# Patient Record
Sex: MALE | Race: WHITE | NOT HISPANIC OR LATINO | Employment: OTHER | URBAN - METROPOLITAN AREA
[De-identification: names, ages, dates, MRNs, and addresses within clinical notes are randomized per-mention and may not be internally consistent; named-entity substitution may affect disease eponyms.]

---

## 2022-04-10 ENCOUNTER — HISTORICAL (OUTPATIENT)
Dept: ADMINISTRATIVE | Facility: HOSPITAL | Age: 77
End: 2022-04-10
Payer: OTHER GOVERNMENT

## 2022-04-29 VITALS — DIASTOLIC BLOOD PRESSURE: 58 MMHG | SYSTOLIC BLOOD PRESSURE: 106 MMHG

## 2022-05-25 ENCOUNTER — OFFICE VISIT (OUTPATIENT)
Dept: NEUROLOGY | Facility: CLINIC | Age: 77
End: 2022-05-25
Payer: OTHER GOVERNMENT

## 2022-05-25 VITALS
SYSTOLIC BLOOD PRESSURE: 112 MMHG | WEIGHT: 205 LBS | BODY MASS INDEX: 28.7 KG/M2 | DIASTOLIC BLOOD PRESSURE: 58 MMHG | HEIGHT: 71 IN

## 2022-05-25 DIAGNOSIS — G70.00 MYASTHENIA GRAVIS: Primary | ICD-10-CM

## 2022-05-25 PROCEDURE — 99213 PR OFFICE/OUTPT VISIT, EST, LEVL III, 20-29 MIN: ICD-10-PCS | Mod: S$PBB,ICN,, | Performed by: NURSE PRACTITIONER

## 2022-05-25 PROCEDURE — 99213 OFFICE O/P EST LOW 20 MIN: CPT | Mod: S$PBB,ICN,, | Performed by: NURSE PRACTITIONER

## 2022-05-25 PROCEDURE — 99999 PR PBB SHADOW E&M-EST. PATIENT-LVL IV: CPT | Mod: PBBFAC,,, | Performed by: NURSE PRACTITIONER

## 2022-05-25 PROCEDURE — 99999 PR PBB SHADOW E&M-EST. PATIENT-LVL IV: ICD-10-PCS | Mod: PBBFAC,,, | Performed by: NURSE PRACTITIONER

## 2022-05-25 PROCEDURE — 99214 OFFICE O/P EST MOD 30 MIN: CPT | Mod: PBBFAC | Performed by: NURSE PRACTITIONER

## 2022-05-25 RX ORDER — ALOGLIPTIN 25 MG/1
25 TABLET, FILM COATED ORAL DAILY
COMMUNITY
Start: 2021-11-29 | End: 2023-09-19

## 2022-05-25 RX ORDER — INSULIN DETEMIR 100 [IU]/ML
50 INJECTION, SOLUTION SUBCUTANEOUS NIGHTLY
COMMUNITY
Start: 2021-11-29

## 2022-05-25 RX ORDER — AMLODIPINE BESYLATE 5 MG/1
5 TABLET ORAL DAILY
COMMUNITY
Start: 2021-11-29 | End: 2023-09-19

## 2022-05-25 RX ORDER — INSULIN ASPART 100 [IU]/ML
20 INJECTION, SOLUTION INTRAVENOUS; SUBCUTANEOUS 2 TIMES DAILY WITH MEALS
COMMUNITY
Start: 2021-11-29

## 2022-05-25 RX ORDER — LISINOPRIL AND HYDROCHLOROTHIAZIDE 20; 25 MG/1; MG/1
1 TABLET ORAL DAILY
COMMUNITY
End: 2023-09-19

## 2022-05-25 RX ORDER — LORATADINE 10 MG/1
10 TABLET ORAL
COMMUNITY
Start: 2021-11-29

## 2022-05-25 RX ORDER — GEMFIBROZIL 600 MG/1
600 TABLET, FILM COATED ORAL 2 TIMES DAILY
COMMUNITY
Start: 2021-11-29

## 2022-05-25 RX ORDER — PYRIDOSTIGMINE BROMIDE 60 MG/1
60 TABLET ORAL 3 TIMES DAILY
COMMUNITY
Start: 2021-11-29

## 2022-05-25 RX ORDER — CARVEDILOL 6.25 MG/1
3.12 TABLET ORAL DAILY
COMMUNITY
Start: 2021-11-29

## 2022-05-25 NOTE — PROGRESS NOTES
"Subjective:       Patient ID: Juan Gold is a 76 y.o. male.    Chief Complaint: Myasthenia Gravis (C/o weakness; abnormal gait; feels like fluid in joints immediately after infusion. On soliris, prev gamunex. Wife states more bad days than good days. Noticed his knees "buckle" on him when walking. )    HPI     Summary of last note:  Dx w/ MG in 2009 (blurred vision, weakness)  Allergic to azothiaprine  Was on cellcept at some point  Has a mediport    On soliris 1200 qow & mestinon 120/day  Infusions are thru [facility]  (was prev on gamunex)  Has never done PLEX    More weakness - knees buckle when he walks  Using his cane to ambulate; uses his walker at home  More tremor over the last 2mo as well  Vision is blurred  No hand strength  No dysphagia at the moment    Has been on soliris for about 4years     Review of Systems    A 14pt ROS was reviewed & is negative unless otherwise documented in the HPI    Objective:      Physical Exam    GENERAL: NAD, calm, cooperative, appropriate  Awake/alert  Well groomed  RESP: CTAB  HEART: RRR  No LE edema  MENTAL STATUS: oriented, follow commands reliably  SPEECH/LANGUAGE: clear, fluent  CN:  Perrla, eomi, vff, gaze conjugate  Blurred vision & fatigable ptosis  Tongue protrudes midline  Motor:   BUE tremor  Hands 2/5  Forearms 3/5  Shoulder shrug 5/5  BLE 4/5  Cerebellar: no tremor or dysmetria  Sensory: normal to tactile stim/vibration  DTRs: normal +2, symmetric  Gait: steady, slow & cautious (forgot cane today)    Irobert np, certify that dr. Oskar albrecht the supervising/rendering physician is physically present in the office at the time of the visit    Assessment:       Problem List Items Addressed This Visit        Neuro    Myasthenia gravis - Primary (Chronic)          Plan:       Will switch his infusions to Vyvgart  Once approved by ins/the [facility], he will start Vyvgart 2 weeks after his last Soliris infusion    He can take additional mestinon if needed until Vyvgart " is approved  Call once 1st Vyvgart infusion is scheduled and we will see him 6 wks after that

## 2022-06-06 ENCOUNTER — TELEPHONE (OUTPATIENT)
Dept: NEUROLOGY | Facility: CLINIC | Age: 77
End: 2022-06-06
Payer: OTHER GOVERNMENT

## 2022-06-07 ENCOUNTER — TELEPHONE (OUTPATIENT)
Dept: NEUROLOGY | Facility: CLINIC | Age: 77
End: 2022-06-07
Payer: OTHER GOVERNMENT

## 2022-06-07 NOTE — TELEPHONE ENCOUNTER
----- Message from Yanet Paulino MA sent at 6/7/2022  2:19 PM CDT -----  Regarding: Infusion Change  Contact: Self  Pt states following up on IVIG infusion change from Solaris to Vyvgart. Pt reports he received his last Solaris infusion 06/01/22.   States he has questions about where his infusions paperwork was sent. States he is calling to clarify what was sent and the process regarding future plan of care. States seeking advice and would like a call back.     Phone:[phone]

## 2022-06-07 NOTE — TELEPHONE ENCOUNTER
Rtn call  Advised it was sent to [facility]  Confirmed it's been received  [facility] many weeks[facility]  It requires insurance investigation and authorization.  He will be contacted After benefits checked.     Asked if he had copies of orig labs, states will look.

## 2022-06-13 ENCOUNTER — TELEPHONE (OUTPATIENT)
Dept: NEUROLOGY | Facility: CLINIC | Age: 77
End: 2022-06-13
Payer: OTHER GOVERNMENT

## 2022-06-13 NOTE — TELEPHONE ENCOUNTER
[facility] with [facility] states they received a referral and need ACHR Status. Pos or neg?     Previous message in chart from 06/06/22.     Callback number: [phone]

## 2022-06-14 NOTE — TELEPHONE ENCOUNTER
This was discussed with Vlad Osorio last week.    Rtn call - advised we do not have ACHR status as pt was dx in 2009 in [place]. He is attempting to get the records.   This will not stop infusion form moving forward.   For documentation purposes only.

## 2022-06-30 ENCOUNTER — TELEPHONE (OUTPATIENT)
Dept: NEUROLOGY | Facility: CLINIC | Age: 77
End: 2022-06-30
Payer: OTHER GOVERNMENT

## 2022-06-30 NOTE — TELEPHONE ENCOUNTER
Stated pt and [facility] need help getting approval for this medication. States it looks like there is an approval, but seeking where approval might be.     Medication: Vyvgart    Ph[facility]: [facility]    Last Appointment: 05/25/22    Next Appointment: none      Phone: [phone], Fax: [phone]

## 2022-06-30 NOTE — TELEPHONE ENCOUNTER
Per rtn call to Rodrigo; After leaving [facility] she reached out to [facility] and was told that Part D has been approved; awaiting VA approval

## 2022-06-30 NOTE — TELEPHONE ENCOUNTER
Pt states on 05/25 appt his treatment was changed from Soliris to Vyvgart. States he is calling for an update and has an infusion tomorrow 07/01/22. States he has no medication and does not want to be in a crisis. States he needs advice on plan of care.     Medication: Vyvgart Infusion    LOV: 05/25/22    NOV: none

## 2022-07-06 ENCOUNTER — TELEPHONE (OUTPATIENT)
Dept: NEUROLOGY | Facility: CLINIC | Age: 77
End: 2022-07-06
Payer: OTHER GOVERNMENT

## 2022-07-06 NOTE — TELEPHONE ENCOUNTER
Rtn call to pt  Jeovanny w male at # provided  Advised [facility] will be reaching out to him to discuss further.

## 2022-07-06 NOTE — TELEPHONE ENCOUNTER
Called [facility] @ [phone]  Spoke brittni Frazier, states they have not yet rcv'd approval of Vyvgart   Reaching out to pharmacy team for clarification.

## 2022-07-06 NOTE — TELEPHONE ENCOUNTER
Pt reports he had a Soliris infusion on 06/17/22. States he is getting bills stating the Vyvgart has been approved and has spoken to the [facility] who told him the MD or NP can order medication from VA. States [facility] told anabel if he orders medication like this they will not do his infusions. Notified of 06/30/22 note.  Pt seeking advice on what to do in case of a crisis. States seeking a callback. Notified of clinic hours.       LOV: 05/25/22    NOV:none

## 2022-07-07 ENCOUNTER — TELEPHONE (OUTPATIENT)
Dept: NEUROLOGY | Facility: CLINIC | Age: 77
End: 2022-07-07
Payer: OTHER GOVERNMENT

## 2022-07-07 NOTE — TELEPHONE ENCOUNTER
States they have received the order for pt's Vyvgart, but the order is needing to be signed.     Medication: Vyvgart    Ph[facility]acy: [facility]    Last Appointment: 05/25/22    Next Appointment: none    Call back number: [phone], Fax: [phone]  Asking if unavailable if Cristina can be contacted.  Phone: [phone]

## 2022-07-07 NOTE — TELEPHONE ENCOUNTER
Pt called to follow up on call from yesterday 07/06. Notified of nurse's advise about [facility] contacting him moving forward.   Pt reports he cannot really do anything, is very weak, eyes drooping and laboring to breath. States these symptoms have progressed since before LOV on 05/25/22 and have been going on for the last 3 to 4 months. Pt states he was advised by YUDI Hernandez to got to [facility] if in crisis.   Pt is asking if there is any infusion center that will allow NP or MD to order the medication through [facility]. States he is seeking advice on plan of care.     LOV: 05/25/22    NOV: none

## 2022-07-07 NOTE — TELEPHONE ENCOUNTER
No, that is not an option.  If he is in crisis, he is to do as I previously instructed & present to the ER.  I let both him & his wife know during his last appointment that vyvgart approval was a very long process, especially with him going thru the [facility].

## 2022-07-07 NOTE — TELEPHONE ENCOUNTER
Called Kirsten @ [facility] @ [phone] - vm/no answer      Called and spoke brittni elkins [facility] @ [phone]  Vyvgart IS approved through pts Medicare    Pt wants it approved through VA, so they will cover expense.  Nohemi submitted info to VA    This will delay initial infusion until VA reviews.    Advised I am being contacted by pt and VA.  Asked that he please clarify what is needed.

## 2022-08-05 ENCOUNTER — TELEPHONE (OUTPATIENT)
Dept: NEUROLOGY | Facility: CLINIC | Age: 77
End: 2022-08-05
Payer: OTHER GOVERNMENT

## 2022-08-05 NOTE — TELEPHONE ENCOUNTER
Pt reports doing his 4th infusion this morning 08/05/22.    Medication: Vyvagart    Pharmacy: none    Last Appointment: 05/25/22    Next Appointment: 08/31/22

## 2022-08-24 ENCOUNTER — TELEPHONE (OUTPATIENT)
Dept: NEUROLOGY | Facility: CLINIC | Age: 77
End: 2022-08-24
Payer: OTHER GOVERNMENT

## 2022-08-24 NOTE — TELEPHONE ENCOUNTER
Reports pt was provided with Vyvgart infusion. Is asking if pt will be continuing this treatment. If so is asking if dosage and frequency can be called in.       Medication: Vyvgart Infusions    Last Appointment: 05/25/22    Next Appointment: 08/30/22

## 2022-08-25 NOTE — TELEPHONE ENCOUNTER
Rtn call  Confirmed pt has infused loading dose of Vyvgart x 4  Reviewed original orders and discussed that infusions are now prn symptoms  Confirmed 12 prn rf's on original order.  Advised Pt has F/U 08/30/22 to re-evaluate

## 2022-08-29 NOTE — PROGRESS NOTES
Subjective:       Patient ID: Juan Gold is a 76 y.o. male.    Chief Complaint: Myasthenia gravis    HPI  Received 4 vyvgart infusions  Last one was on 8/5/22  This is the best that he's felt in a very long time  He was nearly at the point of a crisis   Was eating soups & taking it easy around the house not to labor his breathing anymore that it already was    The day p his 1st infusion, he was cutting his grass & started tinkering with his boat again.  After each infusion, he has felt better and better.  He's decided not to sell their camper & his boat.  Plans to pressure wash his camper & boat today to go camping soon.    Has questions about mediport flushing/maint.    Review of Systems   A 14pt ROS was reviewed & is negative unless otherwise documented in the HPI    Objective:      Physical Exam    GENERAL: NAD, calm, cooperative, appropriate  Awake/alert  Well groomed  RESP: CTAB  HEART: RRR  No LE edema  MENTAL STATUS: oriented, follow commands reliably  SPEECH/LANGUAGE: clear, fluent  CN:  Perrla, eomi, vff, gaze conjugate  No tactile or motor facial asymmetry  Good cheek puff  No fatigable ptosis   Tongue protrudes midline  Motor: no focal weakness  Cerebellar: no tremor or dysmetria  Sensory: normal to tactile stim/vibration  DTRs: normal +2, symmetric  Gait: steady    I, robert london np, certify that dr. Oskar albrecht the supervising/rendering physician is physically present in the office at the time of the visit    Assessment:       Problem List Items Addressed This Visit          Neuro    MG (myasthenia gravis) - Primary (Chronic)       Plan:       Call us when he is symptomatic so we can see him  Call [facility] when he is symptomatic to setup infusion  Will send orders to [facility] for mediport flushes q4-6wks  Rtc 3m or prn weakness

## 2022-08-30 ENCOUNTER — OFFICE VISIT (OUTPATIENT)
Dept: NEUROLOGY | Facility: CLINIC | Age: 77
End: 2022-08-30
Payer: OTHER GOVERNMENT

## 2022-08-30 VITALS
SYSTOLIC BLOOD PRESSURE: 130 MMHG | HEIGHT: 71 IN | BODY MASS INDEX: 28.84 KG/M2 | DIASTOLIC BLOOD PRESSURE: 62 MMHG | WEIGHT: 206 LBS

## 2022-08-30 DIAGNOSIS — G70.00 MG (MYASTHENIA GRAVIS): Primary | ICD-10-CM

## 2022-08-30 PROCEDURE — 99214 OFFICE O/P EST MOD 30 MIN: CPT | Mod: PBBFAC | Performed by: NURSE PRACTITIONER

## 2022-08-30 PROCEDURE — 99213 OFFICE O/P EST LOW 20 MIN: CPT | Mod: S$PBB,,, | Performed by: NURSE PRACTITIONER

## 2022-08-30 PROCEDURE — 99999 PR PBB SHADOW E&M-EST. PATIENT-LVL IV: CPT | Mod: PBBFAC,,, | Performed by: NURSE PRACTITIONER

## 2022-08-30 PROCEDURE — 99999 PR PBB SHADOW E&M-EST. PATIENT-LVL IV: ICD-10-PCS | Mod: PBBFAC,,, | Performed by: NURSE PRACTITIONER

## 2022-08-30 PROCEDURE — 99213 PR OFFICE/OUTPT VISIT, EST, LEVL III, 20-29 MIN: ICD-10-PCS | Mod: S$PBB,,, | Performed by: NURSE PRACTITIONER

## 2022-09-22 ENCOUNTER — TELEPHONE (OUTPATIENT)
Dept: NEUROLOGY | Facility: CLINIC | Age: 77
End: 2022-09-22
Payer: OTHER GOVERNMENT

## 2022-10-03 ENCOUNTER — TELEPHONE (OUTPATIENT)
Dept: NEUROLOGY | Facility: CLINIC | Age: 77
End: 2022-10-03
Payer: OTHER GOVERNMENT

## 2022-10-04 NOTE — PROGRESS NOTES
Subjective:       Patient ID: Juan Gold is a 76 y.o. male.    Chief Complaint: myasthenia gravis (F/u for myasthenia gravis)    HPI  His first Vyvgart infusion was 7/15/2022  Had trouble eating his steak last night & his stamina is going down    Has been more active in the last 2 months than he has for the last 5 years  Planted a large winter garden    Review of Systems   A 14pt ROS was reviewed & is negative unless otherwise documented in the HPI    Objective:      Physical Exam    GENERAL: NAD, calm, cooperative, appropriate  Awake/alert  Well groomed  RESP: CTAB  HEART: RRR  No LE edema  MENTAL STATUS: oriented, follow commands reliably  SPEECH/LANGUAGE: clear, fluent  CN:  Perrla, eomi, vff, gaze conjugate  Extraocular muscles weak  Neck muscles weak  No tactile or motor facial asymmetry  Weak cheek puff  Tongue protrudes midline  Motor: generalized weakness  Cerebellar: no tremor or dysmetria  Sensory: normal to tactile stim/vibration  DTRs: normal +2, symmetric  Gait: steady    I, robert london np, certify that dr. Oskar albrecht the supervising/rendering physician is physically present in the office at the time of the visit    Assessment:       Problem List Items Addressed This Visit          Neuro    MG (myasthenia gravis) - Primary (Chronic)       Plan:       Cont mestinon tid  Vyvgart prn - can do q50d for 1st infusion series if needed  F/u 6m

## 2022-10-06 ENCOUNTER — OFFICE VISIT (OUTPATIENT)
Dept: NEUROLOGY | Facility: CLINIC | Age: 77
End: 2022-10-06
Payer: OTHER GOVERNMENT

## 2022-10-06 VITALS
BODY MASS INDEX: 28.84 KG/M2 | WEIGHT: 206 LBS | DIASTOLIC BLOOD PRESSURE: 74 MMHG | HEIGHT: 71 IN | SYSTOLIC BLOOD PRESSURE: 132 MMHG

## 2022-10-06 DIAGNOSIS — G70.00 MG (MYASTHENIA GRAVIS): Primary | ICD-10-CM

## 2022-10-06 PROCEDURE — 99213 OFFICE O/P EST LOW 20 MIN: CPT | Mod: S$PBB,,, | Performed by: NURSE PRACTITIONER

## 2022-10-06 PROCEDURE — 99999 PR PBB SHADOW E&M-EST. PATIENT-LVL IV: CPT | Mod: PBBFAC,,, | Performed by: NURSE PRACTITIONER

## 2022-10-06 PROCEDURE — 99999 PR PBB SHADOW E&M-EST. PATIENT-LVL IV: ICD-10-PCS | Mod: PBBFAC,,, | Performed by: NURSE PRACTITIONER

## 2022-10-06 PROCEDURE — 99213 PR OFFICE/OUTPT VISIT, EST, LEVL III, 20-29 MIN: ICD-10-PCS | Mod: S$PBB,,, | Performed by: NURSE PRACTITIONER

## 2022-10-06 PROCEDURE — 99214 OFFICE O/P EST MOD 30 MIN: CPT | Mod: PBBFAC | Performed by: NURSE PRACTITIONER

## 2023-03-29 ENCOUNTER — TELEPHONE (OUTPATIENT)
Dept: NEUROLOGY | Facility: CLINIC | Age: 78
End: 2023-03-29
Payer: OTHER GOVERNMENT

## 2023-03-29 NOTE — TELEPHONE ENCOUNTER
States since infusion treatment change from Soliris to Vyvgart he has been doing an approval process every 2 months.   States he was told by [facility] a new order for infusions is needed and they have faxed forms to the clinic.   Is asking why approval is not for a year.  States seeking advice on alternative infusions options.    Medication: Vyvgart Infusion      LOV: 10/6/22    NOV: 4/6/23

## 2023-03-30 ENCOUNTER — TELEPHONE (OUTPATIENT)
Dept: NEUROLOGY | Facility: CLINIC | Age: 78
End: 2023-03-30
Payer: OTHER GOVERNMENT

## 2023-03-30 NOTE — TELEPHONE ENCOUNTER
Notified pt of nurse's advice.     Pt states the infusion center advised him opposite.   States he was told the doctor is only approving one treatment, this is the reason he needs a new prescription for every treatment and this has to go through insurance each time.     Pt states he is concerned about going into a crisis.   Reports experiencing vision changes, feels very weak, fatigue, trouble standing from seated position, exhausted and has difficulty breathing.

## 2023-03-30 NOTE — TELEPHONE ENCOUNTER
This is a question he has to ask his insurance company.   This is insurance driven, not medication, physician or facility driven.

## 2023-03-30 NOTE — TELEPHONE ENCOUNTER
I already signed the orders.    They should be good for a year.  Not sure what [facility] is doing/not doing.     I'd be happy to have Dawna ([facility]) go there/make a call to educate them

## 2023-03-30 NOTE — TELEPHONE ENCOUNTER
States they received this prescription, but it needs a date and signature.   Is asking if prescription can be faxed back once signed and dated to fax# [phone].    I rtn call to get medication name.   No answer. Left VM.    Medication:     LOV: 10/6/22     NOV: 4/6/23

## 2023-03-31 NOTE — TELEPHONE ENCOUNTER
Notified pt of NP's advice.  Pt states he would like Vyvgard rep to reach out to [facility] for education.

## 2023-04-04 NOTE — TELEPHONE ENCOUNTER
Micaela Toney and John elkins [facility] is reaching out to [facility] re their 8 week orders for each cycle of Vyvgart, instead of 12 mths prn as written.

## 2023-04-06 ENCOUNTER — OFFICE VISIT (OUTPATIENT)
Dept: NEUROLOGY | Facility: CLINIC | Age: 78
End: 2023-04-06
Payer: OTHER GOVERNMENT

## 2023-04-06 VITALS
SYSTOLIC BLOOD PRESSURE: 118 MMHG | WEIGHT: 199 LBS | HEIGHT: 71 IN | DIASTOLIC BLOOD PRESSURE: 60 MMHG | BODY MASS INDEX: 27.86 KG/M2

## 2023-04-06 DIAGNOSIS — G70.00 MG (MYASTHENIA GRAVIS): Primary | Chronic | ICD-10-CM

## 2023-04-06 PROCEDURE — 99214 OFFICE O/P EST MOD 30 MIN: CPT | Mod: PBBFAC | Performed by: NURSE PRACTITIONER

## 2023-04-06 PROCEDURE — 99214 PR OFFICE/OUTPT VISIT, EST, LEVL IV, 30-39 MIN: ICD-10-PCS | Mod: S$PBB,,, | Performed by: NURSE PRACTITIONER

## 2023-04-06 PROCEDURE — 99999 PR PBB SHADOW E&M-EST. PATIENT-LVL IV: ICD-10-PCS | Mod: PBBFAC,,, | Performed by: NURSE PRACTITIONER

## 2023-04-06 PROCEDURE — 99999 PR PBB SHADOW E&M-EST. PATIENT-LVL IV: CPT | Mod: PBBFAC,,, | Performed by: NURSE PRACTITIONER

## 2023-04-06 PROCEDURE — 99214 OFFICE O/P EST MOD 30 MIN: CPT | Mod: S$PBB,,, | Performed by: NURSE PRACTITIONER

## 2023-04-06 RX ORDER — ROSUVASTATIN CALCIUM 20 MG/1
20 TABLET, COATED ORAL DAILY
COMMUNITY
Start: 2023-03-07

## 2023-04-06 RX ORDER — METFORMIN HYDROCHLORIDE 500 MG/1
500 TABLET ORAL DAILY
COMMUNITY
Start: 2023-03-07 | End: 2023-09-19

## 2023-04-06 RX ORDER — EMPAGLIFLOZIN 25 MG/1
25 TABLET, FILM COATED ORAL DAILY
COMMUNITY
Start: 2023-03-07

## 2023-04-06 NOTE — PROGRESS NOTES
Subjective:       Patient ID: Juan Gold is a 77 y.o. male.    Chief Complaint: Follow-up (Medication follow up. No concerns. )    HPI  On vyvgart  He is having trouble scheduling appts with [facility]  They are requiring that he have new orders for every infusion  He is even having difficulty getting an appt for a port flush    His last round of infusions were at the beginning of jan  He is having to waiting anywhere from 10 days to 3 weeks to receive an infusion after he contacts them    Was finally able to receive an infusion this past Monday (4/3; he'd reached out to [facility] on 3/17/23 letting them know he'd like to have next round of infusions)  He was having SOB, dysphagia, double vision, poor stamina, was dragging his feet and was on his way toward a crisis.    Unfortunately the [facility] dictates that he has to use [facility]    Review of Systems   A 14pt ROS was reviewed & is negative unless otherwise documented in the HPI    Objective:      Physical Exam    GENERAL: NAD, calm, cooperative, appropriate  Awake/alert  Well groomed  RESP: CTAB  HEART: RRR  No LE edema  MENTAL STATUS: oriented, follow commands reliably  SPEECH/LANGUAGE: clear, fluent  CN:  Perrla, gaze conjugate  Ophthalmoplegia, mild - mod  Neck muscles weak  No tactile or motor facial asymmetry  Weak cheek puff  Tongue protrudes midline  Motor: generalized weakness & bilat foot drop  Cerebellar: no tremor or dysmetria  Sensory: normal to tactile stim/vibration  DTRs: normal +2, symmetric  Gait: steady w/ cane    I, robert london np, certify that dr. Oskar albrecht the supervising/rendering physician is physically present in the office at the time of the visit    Assessment:       Problem List Items Addressed This Visit          Neuro    MG (myasthenia gravis) - Primary (Chronic)       Plan:       Cont mestinon tid  Vyvgart prn - can do q50d for 1st infusion series if needed  His [facility] pharmacist (that he used to have) is aware of the  issues and can hopefully prevent problems in the future for him  F/u 6m    Time spent coordinating care (phone calls, chart review, discussion with patient, exam, etc...) 37 min

## 2023-04-06 NOTE — PATIENT INSTRUCTIONS
"Patient Education       Myasthenia Gravis   The Basics   Written by the doctors and editors at [facility]   What is myasthenia gravis? -- Myasthenia gravis (called "MG" here) is a condition that causes weakness in certain muscles. These include:  Muscles in the eyelids and around the eyes - If MG only affects these muscles, doctors call it "ocular myasthenia gravis." About half of all people with MG have this type.  Jaw muscles  Arm or leg muscles  Muscles that help with breathing  MG happens because of a problem with the body's infection-fighting system, called the "immune system." The immune system normally makes proteins called "antibodies," which help to prevent infection. However, in people with MG, the immune system makes some antibodies that attack the connections between nerves and muscles by mistake.  What are the symptoms of MG? -- The main symptom is muscle weakness. It can come and go, and is often worse later in the day. It can cause:  Droopy eyelids  Blurry vision or double vision  Trouble chewing food - The jaw muscles might feel tired about retirement through a meal.  Trouble swallowing  Trouble talking - A person might speak in a lower voice than usual, or sound like they have a cold or stuffy nose.  Loss of expression on the face  Head that feels heavy or drops forward  Trouble breathing - A person might feel short of breath, take extra breaths, or feel like it takes a lot of effort to breathe.  Weakness - It might be hard to lift the arms or legs, open the fingers, or lift a foot.  Will I need tests? -- Yes. The doctor or nurse will do an exam and learn about your symptoms. You might also have:  Blood tests to look for certain antibodies that are found in people with MG.  Electrical tests of nerves and muscles - These can show if nerves are carrying electrical signals normally and if muscles are responding correctly to electrical signals.  Imaging tests, such as CT or MRI scans - Most people with MG " "have some changes in the thymus gland. This is a gland in the chest that is part of the immune system. Imaging tests can show changes, including a tumor on the thymus gland. Some people have a CT scan or MRI of the head. It can show if a different condition is causing symptoms.  How is MG treated? -- Treatments include:  Medicines to treat muscle weakness, such as pyridostigmine (brand names: Mestinon, Regonol)  Medicines that treat the immune system over time, such as prednisone or azathioprine (brand names: Azasan, Imuran)  Fast-acting immune system treatments, such as:  A medicine called "intravenous immune globulin" (IVIG) - This medicine is given through a thin tube that goes into a vein, called an "IV."  Plasma exchange (also called "plasmapheresis") - For this treatment, a machine pumps blood from the body and removes substances from the blood that are attacking the nerves and muscles. Then the machine returns the blood to the body.  Surgery to remove the thymus gland  If MG attacks the muscles that help with breathing, it can cause severe breathing problems. These are usually treated in the intensive care unit (also called the "ICU").  Children with MG can take some of the same medicines as adults. But some medicines used to treat MG can cause more serious problems in children if used for a long time. Surgery to remove the thymus gland is safe for children and often works well.  What if I want to get pregnant? -- If you want to get pregnant, talk to your doctor or nurse before you start trying to have a baby. Pregnancy can make MG worse.  What else should I know about MG? -- People who have MG that affects more than just the eyes can have serious problems if they get the flu or pneumonia. For this reason, it is especially important that they get the flu shot every year and the pneumonia vaccine at least 1 time.  Some medicines can make MG worse. Talk to your doctor or nurse before you take any medicines, " including over-the-counter medicines. If you get a prescription for a new medicine, ask if it is safe to take when you have MG.  All topics are updated as new evidence becomes available and our peer review process is complete.  This topic retrieved from [employer] on: Sep 21, 2021.  Topic 87258 Version 9.0  Release: 29.4.2 - C29.263  © 2021 UpToDate, Inc. and/or its affiliates. All rights reserved.  Consumer Information Use and Disclaimer   This information is not specific medical advice and does not replace information you receive from your health care provider. This is only a brief summary of general information. It does NOT include all information about conditions, illnesses, injuries, tests, procedures, treatments, therapies, discharge instructions or life-style choices that may apply to you. You must talk with your health care provider for complete information about your health and treatment options. This information should not be used to decide whether or not to accept your health care provider's advice, instructions or recommendations. Only your health care provider has the knowledge and training to provide advice that is right for you. The use of this information is governed by the [employer] End User License Agreement, available at https://www.[employer]/en/solutions/[employer]/[email or web address]/parrish.The use of [employer] content is governed by the [employer] Terms of Use. ©2021 UpToDate, Inc. All rights reserved.  Copyright   © 2021 UpToDate, Inc. and/or its affiliates. All rights reserved.

## 2023-05-25 LAB
LEFT EYE DM RETINOPATHY: ABNORMAL
RIGHT EYE DM RETINOPATHY: ABNORMAL

## 2023-07-05 ENCOUNTER — DOCUMENTATION ONLY (OUTPATIENT)
Dept: FAMILY MEDICINE | Facility: CLINIC | Age: 78
End: 2023-07-05
Payer: OTHER GOVERNMENT

## 2023-09-19 ENCOUNTER — OFFICE VISIT (OUTPATIENT)
Dept: NEUROLOGY | Facility: CLINIC | Age: 78
End: 2023-09-19
Payer: OTHER GOVERNMENT

## 2023-09-19 VITALS
DIASTOLIC BLOOD PRESSURE: 58 MMHG | BODY MASS INDEX: 27.86 KG/M2 | SYSTOLIC BLOOD PRESSURE: 110 MMHG | HEIGHT: 71 IN | WEIGHT: 199 LBS

## 2023-09-19 DIAGNOSIS — G70.00 MG (MYASTHENIA GRAVIS): Primary | Chronic | ICD-10-CM

## 2023-09-19 PROCEDURE — 99214 OFFICE O/P EST MOD 30 MIN: CPT | Mod: PBBFAC | Performed by: NURSE PRACTITIONER

## 2023-09-19 PROCEDURE — 99999 PR PBB SHADOW E&M-EST. PATIENT-LVL IV: ICD-10-PCS | Mod: PBBFAC,,, | Performed by: NURSE PRACTITIONER

## 2023-09-19 PROCEDURE — 99214 PR OFFICE/OUTPT VISIT, EST, LEVL IV, 30-39 MIN: ICD-10-PCS | Mod: S$PBB,,, | Performed by: NURSE PRACTITIONER

## 2023-09-19 PROCEDURE — 99999 PR PBB SHADOW E&M-EST. PATIENT-LVL IV: CPT | Mod: PBBFAC,,, | Performed by: NURSE PRACTITIONER

## 2023-09-19 PROCEDURE — 99214 OFFICE O/P EST MOD 30 MIN: CPT | Mod: S$PBB,,, | Performed by: NURSE PRACTITIONER

## 2023-09-19 RX ORDER — LISINOPRIL AND HYDROCHLOROTHIAZIDE 20; 25 MG/1; MG/1
1 TABLET ORAL DAILY
COMMUNITY
Start: 2023-01-18

## 2023-09-19 RX ORDER — SEMAGLUTIDE 0.68 MG/ML
INJECTION, SOLUTION SUBCUTANEOUS
COMMUNITY
Start: 2023-08-18

## 2023-09-19 NOTE — PROGRESS NOTES
Subjective:       Patient ID: Juan Gold is a 77 y.o. male.    Chief Complaint: MG (myasthenia gravis    HPI  On vyvgart for a little over a year  Feels as though it's no longer working  Last vyvgart was 8/28  Severe body aches after vyvgart infusions & ? rash  Has been receiving them weekly x4 weeks then 4 weeks off, then on cycle again and so on...  He feels like he did when Soliris stopped working    +SOB  Feels like his neck is so weak that his head could fall over  dysphagia; took him forever to eat a steak at home a few days ago  Vision is blurred; diplopia at times  Using a cane to walk  ++fatigue/poor stamina    On mestinon 60mg bid  Allergic to imuran  Was on cellcept for years, but when he started soliris, he was taken off  Hasn't noticed a difference since being off of it    Prev did ivig (would run over 2 days, had fever & body aches the day of & day after infusion); never did plex    Most recent wt 199#    Review of Systems   A 14pt ROS was reviewed & is negative unless otherwise documented in the HPI    Objective:      Physical Exam    GENERAL: NAD, calm, cooperative, appropriate  Looks fatigued  Awake/alert  Well groomed  RESP: CTAB  HEART: RRR  No LE edema  MENTAL STATUS: oriented, follow commands reliably  SPEECH/LANGUAGE: clear, fluent  CN:  Perrla, gaze conjugate  Ophthalmoplegia, worse on OS  +fatiguable ptosis  Neck muscles very weak  No motor facial asymmetry  Weak cheek puff  Tongue protrudes midline  Motor: generalized weakness & bilat foot drop  (I nearly pulled him off of the exam table given his BUE are so weak  Cerebellar: no tremor or dysmetria  Sensory: normal to tactile stim/vibration  DTRs: normal +2, symmetric  Gait: steady w/ cane    Assessment:       Problem List Items Addressed This Visit          Neuro    MG (myasthenia gravis) - Primary (Chronic)       Plan:       Can increase mestinon to 60mg tid; may need to go higher  Will retry IVIG at this point 500mg/kg weekly x6  weeks  No pre-meds; will need to [facility]  Consider cellcept (he had severe allergic reactions to imuran both times that he prev tried it)  Could potentially try Hytrulo (SC Vyvgart)  We briefly discussed PLEX today; deferring for now  F/u after 4th infusion    Leola Hernandez, [facility]-BC

## 2023-09-19 NOTE — PATIENT INSTRUCTIONS
"Patient Education       Myasthenia Gravis   The Basics   Written by the doctors and editors at [facility]   What is myasthenia gravis? -- Myasthenia gravis (called "MG" here) is a condition that causes weakness in certain muscles. These include:  Muscles in the eyelids and around the eyes - If MG only affects these muscles, doctors call it "ocular myasthenia gravis." About half of all people with MG have this type.  Jaw muscles  Arm or leg muscles  Muscles that help with breathing  MG happens because of a problem with the body's infection-fighting system, called the "immune system." The immune system normally makes proteins called "antibodies," which help to prevent infection. However, in people with MG, the immune system makes some antibodies that attack the connections between nerves and muscles by mistake.  What are the symptoms of MG? -- The main symptom is muscle weakness. It can come and go, and is often worse later in the day. It can cause:  Droopy eyelids  Blurry vision or double vision  Trouble chewing food - The jaw muscles might feel tired about longterm through a meal.  Trouble swallowing  Trouble talking - A person might speak in a lower voice than usual, or sound like they have a cold or stuffy nose.  Loss of expression on the face  Head that feels heavy or drops forward  Trouble breathing - A person might feel short of breath, take extra breaths, or feel like it takes a lot of effort to breathe.  Weakness - It might be hard to lift the arms or legs, open the fingers, or lift a foot.  Will I need tests? -- Yes. The doctor or nurse will do an exam and learn about your symptoms. You might also have:  Blood tests to look for certain antibodies that are found in people with MG.  Electrical tests of nerves and muscles - These can show if nerves are carrying electrical signals normally and if muscles are responding correctly to electrical signals.  Imaging tests, such as CT or MRI scans - Most people with MG " "have some changes in the thymus gland. This is a gland in the chest that is part of the immune system. Imaging tests can show changes, including a tumor on the thymus gland. Some people have a CT scan or MRI of the head. It can show if a different condition is causing symptoms.  How is MG treated? -- Treatments include:  Medicines to treat muscle weakness, such as pyridostigmine (brand names: Mestinon, Regonol)  Medicines that treat the immune system over time, such as prednisone or azathioprine (brand names: Azasan, Imuran)  Fast-acting immune system treatments, such as:  A medicine called "intravenous immune globulin" (IVIG) - This medicine is given through a thin tube that goes into a vein, called an "IV."  Plasma exchange (also called "plasmapheresis") - For this treatment, a machine pumps blood from the body and removes substances from the blood that are attacking the nerves and muscles. Then the machine returns the blood to the body.  Surgery to remove the thymus gland  If MG attacks the muscles that help with breathing, it can cause severe breathing problems. These are usually treated in the intensive care unit (also called the "ICU").  Children with MG can take some of the same medicines as adults. But some medicines used to treat MG can cause more serious problems in children if used for a long time. Surgery to remove the thymus gland is safe for children and often works well.  What if I want to get pregnant? -- If you want to get pregnant, talk to your doctor or nurse before you start trying to have a baby. Pregnancy can make MG worse.  What else should I know about MG? -- People who have MG that affects more than just the eyes can have serious problems if they get the flu or pneumonia. For this reason, it is especially important that they get the flu shot every year and the pneumonia vaccine at least 1 time.  Some medicines can make MG worse. Talk to your doctor or nurse before you take any medicines, " including over-the-counter medicines. If you get a prescription for a new medicine, ask if it is safe to take when you have MG.  All topics are updated as new evidence becomes available and our peer review process is complete.  This topic retrieved from [employer] on: Sep 21, 2021.  Topic 70865 Version 9.0  Release: 29.4.2 - C29.263  © 2021 UpToDate, Inc. and/or its affiliates. All rights reserved.  Consumer Information Use and Disclaimer   This information is not specific medical advice and does not replace information you receive from your health care provider. This is only a brief summary of general information. It does NOT include all information about conditions, illnesses, injuries, tests, procedures, treatments, therapies, discharge instructions or life-style choices that may apply to you. You must talk with your health care provider for complete information about your health and treatment options. This information should not be used to decide whether or not to accept your health care provider's advice, instructions or recommendations. Only your health care provider has the knowledge and training to provide advice that is right for you. The use of this information is governed by the [employer] End User License Agreement, available at [email or web address].The use of [employer] content is governed by the [employer] Terms of Use. ©2021 UpToDate, Inc. All rights reserved.  Copyright   © 2021 UpToDate, Inc. and/or its affiliates. All rights reserved.

## 2023-09-22 ENCOUNTER — TELEPHONE (OUTPATIENT)
Dept: NEUROLOGY | Facility: CLINIC | Age: 78
End: 2023-09-22
Payer: OTHER GOVERNMENT

## 2023-09-22 NOTE — TELEPHONE ENCOUNTER
States they received an order for IVIG for this pt.   States due to pt having [facility] a request for service would need to be sent to the [facility] by the clinic for an authorization.     LOV: 9/19/23    NOV: none

## 2023-11-14 ENCOUNTER — TELEPHONE (OUTPATIENT)
Dept: NEUROLOGY | Facility: CLINIC | Age: 78
End: 2023-11-14
Payer: OTHER GOVERNMENT

## 2023-11-14 NOTE — TELEPHONE ENCOUNTER
Rtn call to Zara with [facility] [phone]  Pt to receive infusion # 6 of 6 on 11/16/2023  Will faxed updated nursing notes tomorrow discuss w NP if she would like to continue/reorder treatment.

## 2023-11-14 NOTE — TELEPHONE ENCOUNTER
States they faxed in an order request and is calling to confirm if fax was received.   States pt is out of fills on this medications and pt gets his infusions every 6 weeks.  Is asking if forms and new orders can be sent in.   States seeking a callback.     Medication: Gamunex     Last Appointment: 9/19/2023     Next Appointment: none

## 2023-11-15 NOTE — TELEPHONE ENCOUNTER
Please be on look out for nursing notes coming from [facility]  (Documenting therapy and compliance)

## 2023-11-15 NOTE — TELEPHONE ENCOUNTER
I have not received anything from them.    He was supposed to f/u after 4th infusion.    Can try extending ivig to q2w and I can see him next week or the week after.  Does she have a form that she wants completed or do I have to start from scratch on their blank form we have here?

## 2023-11-20 ENCOUNTER — TELEPHONE (OUTPATIENT)
Dept: NEUROLOGY | Facility: CLINIC | Age: 78
End: 2023-11-20
Payer: OTHER GOVERNMENT

## 2023-11-20 NOTE — TELEPHONE ENCOUNTER
Moe de la rosa...    Zara (the pharmacist) will get his orders tomorrow.  Whatever she sent me was not what I was looking for.

## 2023-11-20 NOTE — TELEPHONE ENCOUNTER
Zara with [facility] called reporting the pt has finished his 6 dosages for the Gammaplex Infusion. Requesting a call back to further discuss if the pt will continue the infusions? If so, they will need a new order sent in. Or if the pt should discontinued getting infusions? Please advise.

## 2023-12-04 ENCOUNTER — TELEPHONE (OUTPATIENT)
Dept: NEUROLOGY | Facility: CLINIC | Age: 78
End: 2023-12-04
Payer: OTHER GOVERNMENT

## 2023-12-04 NOTE — TELEPHONE ENCOUNTER
"Maggie @ [facility] called requesting that we contact pt to schedule f/u appt.  Last note states "f/u after 4th infusion".    Pt contact # [phone]   "

## 2023-12-07 ENCOUNTER — OFFICE VISIT (OUTPATIENT)
Dept: NEUROLOGY | Facility: CLINIC | Age: 78
End: 2023-12-07
Payer: OTHER GOVERNMENT

## 2023-12-07 VITALS
HEIGHT: 71 IN | BODY MASS INDEX: 27.58 KG/M2 | DIASTOLIC BLOOD PRESSURE: 70 MMHG | WEIGHT: 197 LBS | SYSTOLIC BLOOD PRESSURE: 140 MMHG

## 2023-12-07 DIAGNOSIS — G70.00 MG (MYASTHENIA GRAVIS): Primary | ICD-10-CM

## 2023-12-07 PROCEDURE — 99999 PR PBB SHADOW E&M-EST. PATIENT-LVL IV: ICD-10-PCS | Mod: PBBFAC,,, | Performed by: NURSE PRACTITIONER

## 2023-12-07 PROCEDURE — 99214 PR OFFICE/OUTPT VISIT, EST, LEVL IV, 30-39 MIN: ICD-10-PCS | Mod: S$PBB,,, | Performed by: NURSE PRACTITIONER

## 2023-12-07 PROCEDURE — 99214 OFFICE O/P EST MOD 30 MIN: CPT | Mod: PBBFAC | Performed by: NURSE PRACTITIONER

## 2023-12-07 PROCEDURE — 99214 OFFICE O/P EST MOD 30 MIN: CPT | Mod: S$PBB,,, | Performed by: NURSE PRACTITIONER

## 2023-12-07 PROCEDURE — 99999 PR PBB SHADOW E&M-EST. PATIENT-LVL IV: CPT | Mod: PBBFAC,,, | Performed by: NURSE PRACTITIONER

## 2023-12-07 NOTE — PATIENT INSTRUCTIONS
"Patient Education       Myasthenia Gravis   The Basics   Written by the doctors and editors at [facility]   What is myasthenia gravis? -- Myasthenia gravis (called "MG" here) is a condition that causes weakness in certain muscles. These include:  Muscles in the eyelids and around the eyes - If MG only affects these muscles, doctors call it "ocular myasthenia gravis." About half of all people with MG have this type.  Jaw muscles  Arm or leg muscles  Muscles that help with breathing  MG happens because of a problem with the body's infection-fighting system, called the "immune system." The immune system normally makes proteins called "antibodies," which help to prevent infection. However, in people with MG, the immune system makes some antibodies that attack the connections between nerves and muscles by mistake.  What are the symptoms of MG? -- The main symptom is muscle weakness. It can come and go, and is often worse later in the day. It can cause:  Droopy eyelids  Blurry vision or double vision  Trouble chewing food - The jaw muscles might feel tired about [facility] through a meal.  Trouble swallowing  Trouble talking - A person might speak in a lower voice than usual, or sound like they have a cold or stuffy nose.  Loss of expression on the face  Head that feels heavy or drops forward  Trouble breathing - A person might feel short of breath, take extra breaths, or feel like it takes a lot of effort to breathe.  Weakness - It might be hard to lift the arms or legs, open the fingers, or lift a foot.  Will I need tests? -- Yes. The doctor or nurse will do an exam and learn about your symptoms. You might also have:  Blood tests to look for certain antibodies that are found in people with MG.  Electrical tests of nerves and muscles - These can show if nerves are carrying electrical signals normally and if muscles are responding correctly to electrical signals.  Imaging tests, such as CT or MRI scans - Most people with MG " "have some changes in the thymus gland. This is a gland in the chest that is part of the immune system. Imaging tests can show changes, including a tumor on the thymus gland. Some people have a CT scan or MRI of the head. It can show if a different condition is causing symptoms.  How is MG treated? -- Treatments include:  Medicines to treat muscle weakness, such as pyridostigmine (brand names: Mestinon, Regonol)  Medicines that treat the immune system over time, such as prednisone or azathioprine (brand names: Azasan, Imuran)  Fast-acting immune system treatments, such as:  A medicine called "intravenous immune globulin" (IVIG) - This medicine is given through a thin tube that goes into a vein, called an "IV."  Plasma exchange (also called "plasmapheresis") - For this treatment, a machine pumps blood from the body and removes substances from the blood that are attacking the nerves and muscles. Then the machine returns the blood to the body.  Surgery to remove the thymus gland  If MG attacks the muscles that help with breathing, it can cause severe breathing problems. These are usually treated in the intensive care unit (also called the "ICU").  Children with MG can take some of the same medicines as adults. But some medicines used to treat MG can cause more serious problems in children if used for a long time. Surgery to remove the thymus gland is safe for children and often works well.  What if I want to get pregnant? -- If you want to get pregnant, talk to your doctor or nurse before you start trying to have a baby. Pregnancy can make MG worse.  What else should I know about MG? -- People who have MG that affects more than just the eyes can have serious problems if they get the flu or pneumonia. For this reason, it is especially important that they get the flu shot every year and the pneumonia vaccine at least 1 time.  Some medicines can make MG worse. Talk to your doctor or nurse before you take any medicines, " including over-the-counter medicines. If you get a prescription for a new medicine, ask if it is safe to take when you have MG.  All topics are updated as new evidence becomes available and our peer review process is complete.  This topic retrieved from [employer] on: Sep 21, 2021.  Topic 00382 Version 9.0  Release: 29.4.2 - C29.263  © 2021 UpToDate, Inc. and/or its affiliates. All rights reserved.  Consumer Information Use and Disclaimer   This information is not specific medical advice and does not replace information you receive from your health care provider. This is only a brief summary of general information. It does NOT include all information about conditions, illnesses, injuries, tests, procedures, treatments, therapies, discharge instructions or life-style choices that may apply to you. You must talk with your health care provider for complete information about your health and treatment options. This information should not be used to decide whether or not to accept your health care provider's advice, instructions or recommendations. Only your health care provider has the knowledge and training to provide advice that is right for you. The use of this information is governed by the [employer] End User License Agreement, available at [email or web address].The use of [employer] content is governed by the [employer] Terms of Use. ©2021 UpToDate, Inc. All rights reserved.  Copyright   © 2021 UpToDate, Inc. and/or its affiliates. All rights reserved.

## 2023-12-07 NOTE — PROGRESS NOTES
Subjective:       Patient ID: Juan Gold is a 77 y.o. male.    Chief Complaint: MG    HPI  Switched back to IVIG after his last visit  On mestinon 60mg bid (took tid for a little while until ivig could get approved, etc... 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    Allergic to imuran  Was on cellcept for years, but when he started soliris, he was taken off  Hasn't noticed a difference since being off of it    Review of Systems   A 14pt ROS was reviewed & is negative unless otherwise documented in the HPI    Objective:      Physical Exam    GENERAL: NAD, calm, cooperative, appropriate  Awake/alert  Well groomed  RESP: CTAB  HEART: RRR  No LE edema  MENTAL STATUS: oriented, follow commands reliably  SPEECH/LANGUAGE: clear, fluent  CN:  Perrla, gaze conjugate  Min ophthalmoplegia on OS  No fatigable ptosis  No neck flexor/extensor weakness  No motor facial asymmetry  Good cheek puff  Tongue protrudes midline  Motor: no focal weakness  Cerebellar: no tremor or dysmetria  Sensory: normal to tactile stim/vibration  DTRs: normal +2, symmetric  Gait: steady w/o cane    Assessment:       Problem List Items Addressed This Visit          Neuro    MG (myasthenia gravis) - Primary (Chronic)       Plan:       Can increase mestinon to 60mg tid if needed  Cont ivig 500mg/kg q2w     Could consider cellcept (was prev on cellcept & then taken off when he started soliris; he had severe allergic reactions to imuran both times that he prev tried it)    Other potential options: hytrulo; plex (would be last resort)  F/u  6mo    Leola Hernandez, [facility]-BC

## 2024-07-15 NOTE — PROGRESS NOTES
Subjective:       Patient ID: Juan Gold is a 78 y.o. male.    Chief Complaint: MG    HPI  On q2wk ivig (500mg/kg dosing)  Taking mestinon 60mg bid; sometimes tid  Less energy than he used to have  No SOB  Vision is good, can worsen if he watches a lot of TV  Uses a cane for longer distances    Feels like legs are weaker/having muscle pain & spasms; he's on crestor   He cut his dose in 1/2 a couple days ago & he's already noticed an improvement in how he feels    Likes q2wk dosing  Doesn't want to change meds at this time    Allergic to imuran  Was on cellcept for years, but when he started soliris, he was taken off  Hasn't noticed a difference since being off of it    Review of Systems   A 14pt ROS was reviewed & is negative unless otherwise documented in the HPI    Objective:      Physical Exam    GENERAL: NAD, calm, cooperative, appropriate  Awake/alert  Well groomed  RESP: CTAB  HEART: RRR  No LE edema  MENTAL STATUS: oriented, follow commands reliably  SPEECH/LANGUAGE: clear, fluent  CN:  Perrla, gaze conjugate  Min ophthalmoplegia on OS  No fatigable ptosis  No neck flexor/extensor weakness  No motor facial asymmetry  Good cheek puff  Tongue protrudes midline  Motor: no focal weakness  Cerebellar: no tremor or dysmetria  Sensory: normal to tactile stim/vibration  DTRs: normal +2, symmetric  Gait: steady w/o cane    Assessment:       Problem List Items Addressed This Visit          Neuro    MG (myasthenia gravis) - Primary (Chronic)       Orthopedic    Statin myopathy         Plan:       Can take mestinon 60mg tid if needed  Cont ivig 500mg/kg q2w - auth good until 4/5/25  D/c crestor 2.2 statin myopathy  Other potential options: hytrulo; plex (would be last resort)  F/u 6mo    ROSALINDA Martin-BC

## 2024-07-16 ENCOUNTER — OFFICE VISIT (OUTPATIENT)
Dept: NEUROLOGY | Facility: CLINIC | Age: 79
End: 2024-07-16
Payer: OTHER GOVERNMENT

## 2024-07-16 VITALS
DIASTOLIC BLOOD PRESSURE: 70 MMHG | SYSTOLIC BLOOD PRESSURE: 130 MMHG | WEIGHT: 195 LBS | HEIGHT: 71 IN | BODY MASS INDEX: 27.3 KG/M2

## 2024-07-16 DIAGNOSIS — T46.6X5A STATIN MYOPATHY: ICD-10-CM

## 2024-07-16 DIAGNOSIS — G72.0 STATIN MYOPATHY: ICD-10-CM

## 2024-07-16 DIAGNOSIS — G70.00 MG (MYASTHENIA GRAVIS): Primary | Chronic | ICD-10-CM

## 2024-07-16 PROCEDURE — 99999 PR PBB SHADOW E&M-EST. PATIENT-LVL IV: CPT | Mod: PBBFAC,,, | Performed by: NURSE PRACTITIONER

## 2024-07-16 PROCEDURE — 99214 OFFICE O/P EST MOD 30 MIN: CPT | Mod: PBBFAC | Performed by: NURSE PRACTITIONER

## 2024-07-16 PROCEDURE — 99214 OFFICE O/P EST MOD 30 MIN: CPT | Mod: S$PBB,,, | Performed by: NURSE PRACTITIONER

## 2024-07-16 RX ORDER — AMLODIPINE BESYLATE 5 MG/1
5 TABLET ORAL
COMMUNITY
Start: 2024-03-04

## 2024-07-16 RX ORDER — TOLTERODINE 4 MG/1
4 CAPSULE, EXTENDED RELEASE ORAL
COMMUNITY
Start: 2024-01-26

## 2024-07-16 RX ORDER — CHOLECALCIFEROL (VITAMIN D3) 25 MCG
25 TABLET ORAL
COMMUNITY
Start: 2024-03-04

## 2024-07-16 RX ORDER — EZETIMIBE 10 MG/1
10 TABLET ORAL
COMMUNITY
Start: 2024-03-04

## 2024-07-16 NOTE — PATIENT INSTRUCTIONS
"Patient Education       Myasthenia Gravis   The Basics   Written by the doctors and editors at [facility]   What is myasthenia gravis? -- Myasthenia gravis (called "MG" here) is a condition that causes weakness in certain muscles. These include:  Muscles in the eyelids and around the eyes - If MG only affects these muscles, doctors call it "ocular myasthenia gravis." About half of all people with MG have this type.  Jaw muscles  Arm or leg muscles  Muscles that help with breathing  MG happens because of a problem with the body's infection-fighting system, called the "immune system." The immune system normally makes proteins called "antibodies," which help to prevent infection. However, in people with MG, the immune system makes some antibodies that attack the connections between nerves and muscles by mistake.  What are the symptoms of MG? -- The main symptom is muscle weakness. It can come and go, and is often worse later in the day. It can cause:  Droopy eyelids  Blurry vision or double vision  Trouble chewing food - The jaw muscles might feel tired about skilled nursing through a meal.  Trouble swallowing  Trouble talking - A person might speak in a lower voice than usual, or sound like they have a cold or stuffy nose.  Loss of expression on the face  Head that feels heavy or drops forward  Trouble breathing - A person might feel short of breath, take extra breaths, or feel like it takes a lot of effort to breathe.  Weakness - It might be hard to lift the arms or legs, open the fingers, or lift a foot.  Will I need tests? -- Yes. The doctor or nurse will do an exam and learn about your symptoms. You might also have:  Blood tests to look for certain antibodies that are found in people with MG.  Electrical tests of nerves and muscles - These can show if nerves are carrying electrical signals normally and if muscles are responding correctly to electrical signals.  Imaging tests, such as CT or MRI scans - Most people with MG " "have some changes in the thymus gland. This is a gland in the chest that is part of the immune system. Imaging tests can show changes, including a tumor on the thymus gland. Some people have a CT scan or MRI of the head. It can show if a different condition is causing symptoms.  How is MG treated? -- Treatments include:  Medicines to treat muscle weakness, such as pyridostigmine (brand names: Mestinon, Regonol)  Medicines that treat the immune system over time, such as prednisone or azathioprine (brand names: Azasan, Imuran)  Fast-acting immune system treatments, such as:  A medicine called "intravenous immune globulin" (IVIG) - This medicine is given through a thin tube that goes into a vein, called an "IV."  Plasma exchange (also called "plasmapheresis") - For this treatment, a machine pumps blood from the body and removes substances from the blood that are attacking the nerves and muscles. Then the machine returns the blood to the body.  Surgery to remove the thymus gland  If MG attacks the muscles that help with breathing, it can cause severe breathing problems. These are usually treated in the intensive care unit (also called the "ICU").  Children with MG can take some of the same medicines as adults. But some medicines used to treat MG can cause more serious problems in children if used for a long time. Surgery to remove the thymus gland is safe for children and often works well.  What if I want to get pregnant? -- If you want to get pregnant, talk to your doctor or nurse before you start trying to have a baby. Pregnancy can make MG worse.  What else should I know about MG? -- People who have MG that affects more than just the eyes can have serious problems if they get the flu or pneumonia. For this reason, it is especially important that they get the flu shot every year and the pneumonia vaccine at least 1 time.  Some medicines can make MG worse. Talk to your doctor or nurse before you take any medicines, " including over-the-counter medicines. If you get a prescription for a new medicine, ask if it is safe to take when you have MG.  All topics are updated as new evidence becomes available and our peer review process is complete.  This topic retrieved from [employer] on: Sep 21, 2021.  Topic 78427 Version 9.0  Release: 29.4.2 - C29.263  © 2021 UpToDate, Inc. and/or its affiliates. All rights reserved.  Consumer Information Use and Disclaimer   This information is not specific medical advice and does not replace information you receive from your health care provider. This is only a brief summary of general information. It does NOT include all information about conditions, illnesses, injuries, tests, procedures, treatments, therapies, discharge instructions or life-style choices that may apply to you. You must talk with your health care provider for complete information about your health and treatment options. This information should not be used to decide whether or not to accept your health care provider's advice, instructions or recommendations. Only your health care provider has the knowledge and training to provide advice that is right for you. The use of this information is governed by the [employer] End User License Agreement, available at [email or web address] [email or web address]/en/[email or web address]/Qik/about/[email or web address].The use of [employer] content is governed by the [employer] Terms of Use. ©2021 UpToDate, Inc. All rights reserved.  Copyright   © 2021 UpToDate, Inc. and/or its affiliates. All rights reserved.

## 2025-01-29 ENCOUNTER — TELEPHONE (OUTPATIENT)
Dept: NEUROLOGY | Facility: CLINIC | Age: 80
End: 2025-01-29
Payer: OTHER GOVERNMENT

## 2025-01-29 NOTE — TELEPHONE ENCOUNTER
----- Message from Ifrah sent at 1/29/2025 10:47 AM CST -----  Can you reschedule patient appt , so I can [facility] to [facility] for [facility] , I place referral in chart

## 2025-02-26 NOTE — PROGRESS NOTES
Subjective:       Patient ID: Juan Gold is a 79 y.o. male.    Chief Complaint: Myasthenia gravis (Follow up DX: myasthenia gravis. Patient states he has been extremely weak and increased fatigue. 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)  Was on cellcept for years, but when he started soliris, he was taken off by [facility]    Does not feel like IVIG is working that well any longer    Sounds like he had PLEX years ago when he was admitted to the hospital in [place], [place]  Has also been on Soliris, Vyvgart, IVIG    Review of Systems   A 14pt ROS was reviewed & is negative unless otherwise documented in the HPI    Objective:      Physical Exam    GENERAL: NAD, calm, cooperative, appropriate  Awake/alert  Well groomed  RESP: CTAB  HEART: RRR  No LE edema  MENTAL STATUS: oriented, follow commands reliably  SPEECH/LANGUAGE: clear, fluent  CN:  Perrla, gaze conjugate  Mild ophthalmoplegia on OS  OS fatigable ptosis  Weak extraocular muscles  + neck flexor/extensor weakness  No motor facial asymmetry  Weak cheek puff  Tongue protrudes midline  Motor: generalized weakness  Cerebellar: no tremor or dysmetria  Sensory: normal to tactile stim/vibration  DTRs: normal +2, symmetric  Gait: steady w/ cane, slow gait    MG-ADL score today: 13/24  Class IIIb    1. MG (myasthenia gravis)  -     CBC auto differential; Future; Expected date: 02/28/2025  -     Comprehensive metabolic panel; Future; Expected date: 03/07/2025  -     Ambulatory referral/consult to Home Health; Future; Expected date: 03/01/2025  -     mycophenolate (CELLCEPT) 250 mg Cap; Take 1 capsule (250 mg total) by mouth once daily.   Dispense: 30 capsule; Refill: 11  -     IgG; Future; Expected date: 03/07/2025  -     Myasthenia Gravis Eval With Musk Reflex; Future; Expected date: 03/07/2025    2. Therapeutic drug monitoring  -     CBC auto differential; Future; Expected date: 02/28/2025  -     Comprehensive metabolic panel; Future; Expected date: 03/07/2025  -     IgG; Future; Expected date: 03/07/2025      Can take mestinon 60mg tid if needed  Cont ivig 500mg/kg q2w - auth good until 4/5/25  Will try switching him to ultomiris (start ultimiris 2 weeks after his last ivig)  Other potential options: hytrulo; plex (would be last resort)  Would consider restarting cellcept in addition to the above  Need to check labs or get copy of recent ones faxed to us if start  F/u p switches to ultomiris    ROSALINDA Martin-BC      Total visit time of >41 min includes time spent preparing to see the patient (chart review), obtaining and/or reviewing separately obtained history, performing an exam, counseling/educating the patient and/or caregiver, ordering medications, tests or procedures, documenting clinical info in the EHR and care coordination.      Addendum 02/28/25 at 11:06 AM  Have coordinated care after dialogue with MD. He agrees with plan listed belo  Check CBC, CMP, IGG, MG panel.  (Wt is 89 kg)  Start Mycophenolate 250 mg daily X one month, if labs OK.   Will increase Mycophenolate to 250 mg BID after that.  Start Ultomiris  Consult  for skilled nursing, med management, lab draws    ROSALINDA Martin-BC

## 2025-02-27 ENCOUNTER — OFFICE VISIT (OUTPATIENT)
Dept: NEUROLOGY | Facility: CLINIC | Age: 80
End: 2025-02-27
Payer: OTHER GOVERNMENT

## 2025-02-27 VITALS
HEART RATE: 82 BPM | HEIGHT: 71 IN | DIASTOLIC BLOOD PRESSURE: 83 MMHG | SYSTOLIC BLOOD PRESSURE: 147 MMHG | WEIGHT: 195.13 LBS | BODY MASS INDEX: 27.32 KG/M2

## 2025-02-27 DIAGNOSIS — Z51.81 THERAPEUTIC DRUG MONITORING: ICD-10-CM

## 2025-02-27 DIAGNOSIS — G70.00 MG (MYASTHENIA GRAVIS): Primary | Chronic | ICD-10-CM

## 2025-02-27 PROCEDURE — 99999 PR PBB SHADOW E&M-EST. PATIENT-LVL IV: CPT | Mod: PBBFAC,,, | Performed by: NURSE PRACTITIONER

## 2025-02-27 PROCEDURE — 99214 OFFICE O/P EST MOD 30 MIN: CPT | Mod: PBBFAC | Performed by: NURSE PRACTITIONER

## 2025-02-27 PROCEDURE — 99215 OFFICE O/P EST HI 40 MIN: CPT | Mod: S$PBB,,, | Performed by: NURSE PRACTITIONER

## 2025-02-28 ENCOUNTER — PATIENT MESSAGE (OUTPATIENT)
Dept: NEUROLOGY | Facility: CLINIC | Age: 80
End: 2025-02-28
Payer: OTHER GOVERNMENT

## 2025-02-28 ENCOUNTER — TELEPHONE (OUTPATIENT)
Dept: NEUROLOGY | Facility: CLINIC | Age: 80
End: 2025-02-28
Payer: OTHER GOVERNMENT

## 2025-02-28 DIAGNOSIS — G70.00 MG (MYASTHENIA GRAVIS): Primary | Chronic | ICD-10-CM

## 2025-02-28 RX ORDER — DIPHENHYDRAMINE HYDROCHLORIDE 50 MG/ML
50 INJECTION INTRAMUSCULAR; INTRAVENOUS ONCE AS NEEDED
OUTPATIENT
Start: 2025-02-28

## 2025-02-28 RX ORDER — MYCOPHENOLATE MOFETIL 250 MG/1
250 CAPSULE ORAL DAILY
Qty: 30 CAPSULE | Refills: 11 | Status: SHIPPED | OUTPATIENT
Start: 2025-02-28

## 2025-02-28 RX ORDER — HEPARIN 100 UNIT/ML
500 SYRINGE INTRAVENOUS
OUTPATIENT
Start: 2025-02-28

## 2025-02-28 RX ORDER — SODIUM CHLORIDE 0.9 % (FLUSH) 0.9 %
10 SYRINGE (ML) INJECTION
OUTPATIENT
Start: 2025-02-28

## 2025-02-28 RX ORDER — EPINEPHRINE 0.3 MG/.3ML
0.3 INJECTION SUBCUTANEOUS ONCE AS NEEDED
OUTPATIENT
Start: 2025-02-28

## 2025-03-05 RX ORDER — NEISSERIA MENINGITIDIS SEROGROUP B NHBA FUSION PROTEIN ANTIGEN, NEISSERIA MENINGITIDIS SEROGROUP B FHBP FUSION PROTEIN ANTIGEN AND NEISSERIA MENINGITIDIS SEROGROUP B NADA PROTEIN ANTIGEN 50; 50; 50; 25 UG/.5ML; UG/.5ML; UG/.5ML; UG/.5ML
0.5 INJECTION, SUSPENSION INTRAMUSCULAR ONCE
Qty: 0.5 ML | Refills: 0 | Status: SHIPPED | OUTPATIENT
Start: 2025-03-05 | End: 2025-03-05

## 2025-03-05 RX ORDER — MENINGOCOCCAL (GROUPS A, C, Y AND W-135) OLIGOSACCHARIDE DIPHTHERIA CRM197 CONJUGATE VACCINE 10-5/0.5ML
KIT INTRAMUSCULAR
Qty: 0.5 ML | Refills: 0 | Status: SHIPPED | OUTPATIENT
Start: 2025-03-05

## 2025-03-05 NOTE — TELEPHONE ENCOUNTER
NEW ULTOMIRIS START:     Discussed with patient Ultomiris therapy for gMG.   Patient is familiar with complement inhibitors; was on Soliris starting in 2018.  Discussed infusion will start with a Loading dose: IV: 2700 mg as a single dose.   Then he will return 2 weeks after the LD for his first maintenance dose of 3300 mg. Maintenance doses will then be given every 8 weeks from that point on.   Infusions do not require pre-medications, and infusion time should take ~1 hr. Will be monitored for another ~1 hr post-infusion to ensure no reactions occur.     REMS:   Patient completed initial vaccine series for both Menveo and Bexero vaccines in 2018. Recommended 1 booster dose of each vaccine due to it being over 5 years from last doses per ACIP recommendations.    Discussed the increased risk of infections for Ultomiris; specifically BBW for serious meningococcal infections. Linking below both the Patient Safety Card and the Patient Guide for Ultomiris for patient to view. Discussed signs and symptoms of infections, and prompts to seek medical attention should they occur (fever, stiff neck, etc).    ULTOMIRIS-REMS-PATIENT-SAFETY-CARD   Ultomiris_and_[email or web address]_[email or web address]_Guide.pdf     Orders were sent to [facility] infusion center to begin getting authorized. Patient currenty getting IVIG via [facility] every 2 weeks. Plan is to continue this treatment until Ultomiris is approved. He will then be notified by infusion center to schedule first infusion once authorization is in place.      Patient voiced understanding, and was encouraged to reach out with any questions.          Thanks,      Miguel Ángel Morris, PharmD  Clinical Pharmacist - Neurology  [facility]   O: [phone]

## 2025-04-01 ENCOUNTER — TELEPHONE (OUTPATIENT)
Dept: NEUROLOGY | Facility: CLINIC | Age: 80
End: 2025-04-01
Payer: OTHER GOVERNMENT

## 2025-04-03 ENCOUNTER — LAB REQUISITION (OUTPATIENT)
Dept: LAB | Facility: HOSPITAL | Age: 80
End: 2025-04-03
Payer: OTHER GOVERNMENT

## 2025-04-03 DIAGNOSIS — E11.36 TYPE 2 DIABETES MELLITUS WITH DIABETIC CATARACT: ICD-10-CM

## 2025-04-03 DIAGNOSIS — E55.9 VITAMIN D DEFICIENCY, UNSPECIFIED: ICD-10-CM

## 2025-04-03 DIAGNOSIS — G70.00 MYASTHENIA GRAVIS WITHOUT (ACUTE) EXACERBATION: ICD-10-CM

## 2025-04-03 DIAGNOSIS — I11.9 HYPERTENSIVE HEART DISEASE WITHOUT HEART FAILURE: ICD-10-CM

## 2025-04-03 LAB
ALBUMIN SERPL-MCNC: 3.8 G/DL (ref 3.4–4.8)
ALBUMIN/GLOB SERPL: 0.9 RATIO (ref 1.1–2)
ALP SERPL-CCNC: 56 UNIT/L (ref 40–150)
ALT SERPL-CCNC: 21 UNIT/L (ref 0–55)
ANION GAP SERPL CALC-SCNC: 11 MEQ/L
AST SERPL-CCNC: 22 UNIT/L (ref 11–45)
BASOPHILS # BLD AUTO: 0.07 X10(3)/MCL
BASOPHILS NFR BLD AUTO: 1.2 %
BILIRUB SERPL-MCNC: 0.4 MG/DL
BUN SERPL-MCNC: 30.6 MG/DL (ref 8.4–25.7)
CALCIUM SERPL-MCNC: 9.4 MG/DL (ref 8.8–10)
CHLORIDE SERPL-SCNC: 105 MMOL/L (ref 98–107)
CO2 SERPL-SCNC: 22 MMOL/L (ref 23–31)
CREAT SERPL-MCNC: 1.1 MG/DL (ref 0.72–1.25)
CREAT/UREA NIT SERPL: 28
EOSINOPHIL # BLD AUTO: 0.27 X10(3)/MCL (ref 0–0.9)
EOSINOPHIL NFR BLD AUTO: 4.5 %
ERYTHROCYTE [DISTWIDTH] IN BLOOD BY AUTOMATED COUNT: 14.5 % (ref 11.5–17)
GFR SERPLBLD CREATININE-BSD FMLA CKD-EPI: >60 ML/MIN/1.73/M2
GLOBULIN SER-MCNC: 4.2 GM/DL (ref 2.4–3.5)
GLUCOSE SERPL-MCNC: 135 MG/DL (ref 82–115)
HCT VFR BLD AUTO: 40.6 % (ref 42–52)
HGB BLD-MCNC: 13.7 G/DL (ref 14–18)
IMM GRANULOCYTES # BLD AUTO: 0.01 X10(3)/MCL (ref 0–0.04)
IMM GRANULOCYTES NFR BLD AUTO: 0.2 %
LYMPHOCYTES # BLD AUTO: 2.19 X10(3)/MCL (ref 0.6–4.6)
LYMPHOCYTES NFR BLD AUTO: 36.7 %
MCH RBC QN AUTO: 30 PG (ref 27–31)
MCHC RBC AUTO-ENTMCNC: 33.7 G/DL (ref 33–36)
MCV RBC AUTO: 89 FL (ref 80–94)
MONOCYTES # BLD AUTO: 0.52 X10(3)/MCL (ref 0.1–1.3)
MONOCYTES NFR BLD AUTO: 8.7 %
NEUTROPHILS # BLD AUTO: 2.9 X10(3)/MCL (ref 2.1–9.2)
NEUTROPHILS NFR BLD AUTO: 48.7 %
PLATELET # BLD AUTO: 163 X10(3)/MCL (ref 130–400)
PMV BLD AUTO: 12.5 FL (ref 7.4–10.4)
POTASSIUM SERPL-SCNC: 4.2 MMOL/L (ref 3.5–5.1)
PROT SERPL-MCNC: 8 GM/DL (ref 5.8–7.6)
RBC # BLD AUTO: 4.56 X10(6)/MCL (ref 4.7–6.1)
SODIUM SERPL-SCNC: 138 MMOL/L (ref 136–145)
WBC # BLD AUTO: 5.96 X10(3)/MCL (ref 4.5–11.5)

## 2025-04-03 PROCEDURE — 80053 COMPREHEN METABOLIC PANEL: CPT | Performed by: NURSE PRACTITIONER

## 2025-04-03 PROCEDURE — 85025 COMPLETE CBC W/AUTO DIFF WBC: CPT | Performed by: NURSE PRACTITIONER

## 2025-04-24 ENCOUNTER — TELEPHONE (OUTPATIENT)
Dept: NEUROLOGY | Facility: CLINIC | Age: 80
End: 2025-04-24
Payer: OTHER GOVERNMENT

## 2025-07-14 NOTE — PROGRESS NOTES
Subjective:       Patient ID: Juan Gold is a 79 y.o. male.    Chief Complaint: MG    HPI  On ultimiris 3300mg q8w  Started mycophenolate 250mg/day  Taking mestinon 60mg bid; sometimes tid    Has had a slight improvement on ultomiris  Tolerating mycophenolate fine (he started it mid-March)    He is still...  Fatigued  No stamina  Having SOB at rest  Vision gets blurred when he watches TV   Uses a cane for longer distances  Overall, generally weak  Gets fatigued when chewing; mostly eats soup/soft things  Driving is getting to be more challenging    Allergic to imuran (anaphylaxis)  Was on cellcept for years, but when he started soliris, he was taken off by [facility]  Thinks he was prev on 1000mg bid of cellcept    Sounds like he had PLEX years ago when he was admitted to the hospital in [place], [place]  Has also been on Soliris, Vyvgart, IVIG, mestinon, prednisone    Review of Systems   A 14pt ROS was reviewed & is negative unless otherwise documented in the HPI    Objective:      Physical Exam    GENERAL: NAD, calm, cooperative, appropriate  Awake/alert  Well groomed  RESP: CTAB  HEART: RRR  No LE edema  MENTAL STATUS: oriented, follow commands reliably  SPEECH/LANGUAGE: clear, fluent  CN:  Perrla, gaze conjugate  Mild ophthalmoplegia on OS  OS fatigable ptosis  Weak extraocular muscles  + neck flexor/extensor weakness  No motor facial asymmetry  Weak cheek puff  Tongue protrudes midline  Motor: generalized weakness  Cerebellar: no tremor or dysmetria  Sensory: normal to tactile stim/vibration  DTRs: normal +2, symmetric  Gait: steady w/ cane, slow gait    MG-ADL score today: 11/24 (prev 13/24 before starting ultomiris)    LABORATORY:  Ach receptor binding Ab: 10.6 (4/3/25).      1. MG (myasthenia gravis)  -     mycophenolate (CELLCEPT) 250 mg Cap; Take 1 capsule (250 mg total) by mouth 2 (two) times daily.  Dispense: 180 capsule; Refill: 4      Can take mestinon 60mg tid if needed  Consider ER mestinon in the  future  Cont ultimiris 3300mg q8w  Other potential options: hytrulo; plex (would be last resort)  Increase mycophenolate to 250mg bid (goal would be 1000mg bid)  F/u 2m    ROSALINDA Martin-BC      Total visit time of >43 min includes time spent preparing to see the patient (chart review), obtaining and/or reviewing separately obtained history, performing an exam, counseling/educating the patient and/or caregiver, ordering medications, tests or procedures, documenting clinical info in the EHR and care coordination.    ROSALINDA Martin-BC

## 2025-07-15 ENCOUNTER — OFFICE VISIT (OUTPATIENT)
Dept: NEUROLOGY | Facility: CLINIC | Age: 80
End: 2025-07-15
Payer: OTHER GOVERNMENT

## 2025-07-15 VITALS
BODY MASS INDEX: 26.88 KG/M2 | HEIGHT: 71 IN | WEIGHT: 192 LBS | SYSTOLIC BLOOD PRESSURE: 110 MMHG | DIASTOLIC BLOOD PRESSURE: 60 MMHG

## 2025-07-15 DIAGNOSIS — G70.00 MG (MYASTHENIA GRAVIS): Primary | Chronic | ICD-10-CM

## 2025-07-15 PROCEDURE — 99215 OFFICE O/P EST HI 40 MIN: CPT | Mod: S$PBB,,, | Performed by: NURSE PRACTITIONER

## 2025-07-15 PROCEDURE — 99999 PR PBB SHADOW E&M-EST. PATIENT-LVL IV: CPT | Mod: PBBFAC,,, | Performed by: NURSE PRACTITIONER

## 2025-07-15 PROCEDURE — 99214 OFFICE O/P EST MOD 30 MIN: CPT | Mod: PBBFAC | Performed by: NURSE PRACTITIONER

## 2025-07-15 RX ORDER — RAVULIZUMAB 300 MG/3ML
SOLUTION, CONCENTRATE INTRAVENOUS
COMMUNITY

## 2025-07-15 RX ORDER — MYCOPHENOLATE MOFETIL 250 MG/1
250 CAPSULE ORAL 2 TIMES DAILY
Qty: 180 CAPSULE | Refills: 4 | Status: SHIPPED | OUTPATIENT
Start: 2025-07-15

## 2025-07-15 RX ORDER — INSULIN GLARGINE 100 [IU]/ML
35 INJECTION, SOLUTION SUBCUTANEOUS NIGHTLY
COMMUNITY
Start: 2025-03-03